# Patient Record
Sex: MALE | Race: WHITE | NOT HISPANIC OR LATINO
[De-identification: names, ages, dates, MRNs, and addresses within clinical notes are randomized per-mention and may not be internally consistent; named-entity substitution may affect disease eponyms.]

---

## 2016-12-29 NOTE — H&P ADULT. - HISTORY OF PRESENT ILLNESS
Onset 3-4 days ago of mild LLQ pain.  Pain became severe yesterday. No nausea, fever.  Normal BM 2 days ago.  No prior similar pain.  Colonoscopy 2014.

## 2016-12-29 NOTE — ED PROVIDER NOTE - PRINCIPAL DIAGNOSIS
Left lower quadrant abdominal tenderness Diverticulitis of intestine with abscess without bleeding, unspecified part of intestinal tract

## 2016-12-29 NOTE — ED PROVIDER NOTE - CARE PLAN
Principal Discharge DX:	Left lower quadrant abdominal tenderness Principal Discharge DX:	Diverticulitis of intestine with abscess without bleeding, unspecified part of intestinal tract

## 2016-12-29 NOTE — ED PROVIDER NOTE - PROGRESS NOTE DETAILS
patient declining analgesia medications discussed case with Dr. Estrada, will admit to his service, zosyn started

## 2016-12-29 NOTE — ED PROVIDER NOTE - OBJECTIVE STATEMENT
62 yo male hx of alcoholism (sober for 20 years) c/o left lower quadrant and suprapubic pain since yesterday.  No fever/chills, no nausea/vomiting/diarrhea.  Took motrin this morning that helped with the pain.  Sent in by Dr. Carly Chan for further evaluation.

## 2017-01-04 ENCOUNTER — OTHER (OUTPATIENT)
Age: 62
End: 2017-01-04

## 2017-01-04 DIAGNOSIS — K57.92 DIVERTICULITIS OF INTESTINE, PART UNSPECIFIED, W/OUT PERFORATION OR ABSCESS W/OUT BLEEDING: ICD-10-CM

## 2017-01-06 ENCOUNTER — OTHER (OUTPATIENT)
Age: 62
End: 2017-01-06

## 2017-01-06 ENCOUNTER — TRANSCRIPTION ENCOUNTER (OUTPATIENT)
Age: 62
End: 2017-01-06

## 2017-01-06 NOTE — DISCHARGE NOTE ADULT - CARE PROVIDERS DIRECT ADDRESSES
,[email or web address]

## 2017-01-06 NOTE — DISCHARGE NOTE ADULT - PROVIDER TOKENS
TOKSCARLET:'[id number] TOKEN:'[id number]:[LAST:[brand],FIRST:[kath],PHONE:[phone]],FAX:[(   )    -],ADDRESS:[follow up for Xarelto Management and repeat US doppler of RUE]] TOKEN:'[id number]:[LAST:[Brand],FIRST:[Carly],PHONE:[(   )    -],FAX:[(   )    -],ADDRESS:[For management of warfarin]]

## 2017-01-06 NOTE — DISCHARGE NOTE ADULT - MEDICATION SUMMARY - MEDICATIONS TO TAKE
I will START or STAY ON the medications listed below when I get home from the hospital:    amoxicillin-clavulanate 875 mg-125 mg oral tablet  -- 1 tab(s) by mouth 2 times a day  -- Finish all this medication unless otherwise directed by prescriber.  Take with food or milk.    -- Indication: For Diverticulitis of intestine with perforation or abscess without bleeding I will START or STAY ON the medications listed below when I get home from the hospital:    warfarin 2 mg oral tablet  -- 5.5 milligram(s) by mouth once a day  -- Indication: For pulmonary emboli and DVT I will START or STAY ON the medications listed below when I get home from the hospital:    warfarin 2 mg oral tablet  -- 5.5 milligram(s) by mouth once a day  -- Indication: For Pulmonary embolism

## 2017-01-06 NOTE — DISCHARGE NOTE ADULT - COMMUNITY RESOURCES
PT/INR 2x week x 2 weeks- 1/16-1/20-1/23/-1/27 at [facility] -   Results to be sent to Dr. Keshia Chan [phone]

## 2017-01-06 NOTE — DISCHARGE NOTE ADULT - NS AS DC VTE INSTRUCTION
Coumadin/Warfarin - Compliance.../Coumadin/Warfarin - Dietary Advice.../Coumadin/Warfarin - Potential for adverse drug reactions and interactions/Coumadin/Warfarin - Follow-up monitoring...

## 2017-01-06 NOTE — DISCHARGE NOTE ADULT - PLAN OF CARE
resolution of diverticulitis low fiber diet until see at f/u appointment in 10 days with Dr. Katz,  Make follow up appointment to see Dr. Katz in 10 days, [phone]  No heavy lifting  If fever, increasing pain, changes in bowel movements please call office Improve breathing and resolve blood clot Coumadin for blood clot- 5.5 mg daily. Check INR on Tuesday low fiber diet until see at f/u appointment in  2 weeks with Dr. Katz, No further antibiotics required at this time.   Make follow up appointment to see Dr. Katz in 2 weeks, [phone]  If fever, increasing pain, changes in bowel movements please call office

## 2017-01-06 NOTE — DISCHARGE NOTE ADULT - CARE PLAN
Principal Discharge DX:	Diverticulitis of intestine with abscess without bleeding, unspecified part of intestinal tract  Goal:	resolution of diverticulitis  Instructions for follow-up, activity and diet:	low fiber diet until see at f/u appointment in 10 days with Dr. Katz,  Make follow up appointment to see Dr. Katz in 10 days, [phone]  No heavy lifting  If fever, increasing pain, changes in bowel movements please call office Principal Discharge DX:	Diverticulitis of intestine with abscess without bleeding, unspecified part of intestinal tract  Goal:	resolution of diverticulitis  Instructions for follow-up, activity and diet:	low fiber diet until see at f/u appointment in 10 days with Dr. Katz,  Make follow up appointment to see Dr. Katz in 10 days, [phone]  No heavy lifting  If fever, increasing pain, changes in bowel movements please call office Principal Discharge DX:	Diverticulitis of intestine with abscess without bleeding, unspecified part of intestinal tract  Goal:	resolution of diverticulitis  Instructions for follow-up, activity and diet:	low fiber diet until see at f/u appointment in 10 days with Dr. Katz,  Make follow up appointment to see Dr. Katz in 10 days, [phone]  No heavy lifting  If fever, increasing pain, changes in bowel movements please call office Principal Discharge DX:	Diverticulitis of intestine with abscess without bleeding, unspecified part of intestinal tract  Goal:	resolution of diverticulitis  Instructions for follow-up, activity and diet:	low fiber diet until see at f/u appointment in 10 days with Dr. Katz,  Make follow up appointment to see Dr. Katz in 10 days, [phone]  No heavy lifting  If fever, increasing pain, changes in bowel movements please call office Principal Discharge DX:	Diverticulitis of intestine with abscess without bleeding, unspecified part of intestinal tract  Goal:	resolution of diverticulitis  Instructions for follow-up, activity and diet:	low fiber diet until see at f/u appointment in 10 days with Dr. Katz,  Make follow up appointment to see Dr. Katz in 10 days, [phone]  No heavy lifting  If fever, increasing pain, changes in bowel movements please call office Principal Discharge DX:	Diverticulitis of intestine with abscess without bleeding, unspecified part of intestinal tract  Goal:	resolution of diverticulitis  Instructions for follow-up, activity and diet:	low fiber diet until see at f/u appointment in 10 days with Dr. Katz,  Make follow up appointment to see Dr. Katz in 10 days, [phone]  No heavy lifting  If fever, increasing pain, changes in bowel movements please call office Principal Discharge DX:	Diverticulitis of intestine with abscess without bleeding, unspecified part of intestinal tract  Goal:	resolution of diverticulitis  Instructions for follow-up, activity and diet:	low fiber diet until see at f/u appointment in  2 weeks with Dr. Katz, No further antibiotics required at this time.   Make follow up appointment to see Dr. Katz in 2 weeks, [phone]  If fever, increasing pain, changes in bowel movements please call office  Secondary Diagnosis:	Pulmonary embolism  Goal:	Improve breathing and resolve blood clot  Instructions for follow-up, activity and diet:	Coumadin for blood clot- 5.5 mg daily. Check INR on Tuesday Principal Discharge DX:	Diverticulitis of intestine with abscess without bleeding, unspecified part of intestinal tract  Goal:	resolution of diverticulitis  Instructions for follow-up, activity and diet:	low fiber diet until see at f/u appointment in  2 weeks with Dr. Katz, No further antibiotics required at this time.   Make follow up appointment to see Dr. Katz in 2 weeks, [phone]  If fever, increasing pain, changes in bowel movements please call office  Secondary Diagnosis:	Pulmonary embolism  Goal:	Improve breathing and resolve blood clot  Instructions for follow-up, activity and diet:	Coumadin for blood clot- 5.5 mg daily. Check INR on Tuesday Principal Discharge DX:	Diverticulitis of intestine with abscess without bleeding, unspecified part of intestinal tract  Goal:	resolution of diverticulitis  Instructions for follow-up, activity and diet:	low fiber diet until see at f/u appointment in  2 weeks with Dr. Katz, No further antibiotics required at this time.   Make follow up appointment to see Dr. Katz in 2 weeks, [phone]  If fever, increasing pain, changes in bowel movements please call office  Secondary Diagnosis:	Pulmonary embolism  Goal:	Improve breathing and resolve blood clot  Instructions for follow-up, activity and diet:	Coumadin for blood clot- 5.5 mg daily. Check INR on Tuesday Principal Discharge DX:	Diverticulitis of intestine with abscess without bleeding, unspecified part of intestinal tract  Goal:	resolution of diverticulitis  Instructions for follow-up, activity and diet:	low fiber diet until see at f/u appointment in  2 weeks with Dr. Katz, No further antibiotics required at this time.   Make follow up appointment to see Dr. Katz in 2 weeks, [phone]  If fever, increasing pain, changes in bowel movements please call office  Secondary Diagnosis:	Pulmonary embolism  Goal:	Improve breathing and resolve blood clot  Instructions for follow-up, activity and diet:	Coumadin for blood clot- 5.5 mg daily. Check INR on Tuesday Principal Discharge DX:	Diverticulitis of intestine with abscess without bleeding, unspecified part of intestinal tract  Goal:	resolution of diverticulitis  Instructions for follow-up, activity and diet:	low fiber diet until see at f/u appointment in  2 weeks with Dr. Katz, No further antibiotics required at this time.   Make follow up appointment to see Dr. Katz in 2 weeks, [phone]  If fever, increasing pain, changes in bowel movements please call office  Secondary Diagnosis:	Pulmonary embolism  Goal:	Improve breathing and resolve blood clot  Instructions for follow-up, activity and diet:	Coumadin for blood clot- 5.5 mg daily. Check INR on Tuesday

## 2017-01-06 NOTE — DISCHARGE NOTE ADULT - CARE PROVIDER_API CALL
Joselo Katz), ColonRectal Surgery; Surgery  [address] [facility] [address] [place] [address]  Phone: [phone]  Fax: [phone] Joselo Katz), ColonRectal Surgery; Surgery  [address] [facility] [address] [place] [address]  Phone: [phone]  Fax: [phone]    brand, kath  follow up for Xarelto Management and repeat US doppler of RUE  Phone: [phone]  Fax: (   )    - Joselo Katz), ColonRectal Surgery; Surgery  [address] [facility] [address] [place] [address]  Phone: [phone]  Fax: [phone]    Carly Chan  For management of warfarin  Phone: (   )    -  Fax: (   )    -

## 2017-01-06 NOTE — DISCHARGE NOTE ADULT - PATIENT PORTAL LINK FT
“You can access the FollowHealth Patient Portal, offered by [facility], by registering with the following website: [email or web address]”

## 2017-01-06 NOTE — DISCHARGE NOTE ADULT - HOSPITAL COURSE
61 year old male presented with LLQ pain x2-3 days. On ct abd/pelvis showed to have sigmoid diverticulitis with abscess. pt placed on IV antibiotics with repeat ct scan revealing improved diverticulitis. pt will be discharged on augmentin 875/125 mg BID x 10days and will followup in 10 days with Dr. Katz. Repeat ct scan to be done prior to follow up to evaluate diverticulitis 61 year old male presented with left lower quadrant pain  for 2-3 days. He was found to have sigmoid diverticulitis with abscess on CT scan. He was treated with IV antibiotics, bowel rest and repeat ct scan revealed improved diverticulitis. He improved clinically and initially plan was for home IV antibiotics given intramural abscess. As such, a PICC line was placed. Decision was made to discharge him on oral antibiotics given his rapid improvement. On the day of PICC line removal, there was swelling in the right upper extremity and Ultrasound confirmed a DVT. He was initiated on lovenox and subsequently warfarin. He also developed a pulmonary emboli confirmed on Chest CT. He remained hemodynamically stable and is discharged with a therapeutic INR (goal 2-3) on coumadin.

## 2017-01-06 NOTE — DISCHARGE NOTE ADULT - ADDITIONAL INSTRUCTIONS
Make appointment to see Dr. Katz in office in 10 days [phone]  Follow low fiber diet until you see Dr. Katz  No Heavy Lifting Make appointment to see Dr. Katz in office in 10 days [phone]  Follow low fiber diet until you see Dr. Katz  No Heavy Lifting  Follow up with Keshia Chan - libby PMD, follow up for management of Xarelto for RUE DVT and repeat US doppler for RUE DVT in 1 month and in 3 months  discussed with the patient Make appointment to see Dr. Katz in office in 2 weeks [phone]  Follow low fiber diet until you see Dr. Katz    Follow up with Dr. Carly Chan - medical PMD, follow up for management of Coumadin for right upper arm blood clot and pulmonary embolism.

## 2017-01-12 ENCOUNTER — OTHER (OUTPATIENT)
Age: 62
End: 2017-01-12

## 2017-01-14 RX ORDER — WARFARIN SODIUM 2.5 MG/1
6 TABLET ORAL
Qty: 0 | Refills: 0 | COMMUNITY
Start: 2017-01-14 | End: 2017-02-13

## 2017-01-16 ENCOUNTER — OUTPATIENT (OUTPATIENT)
Dept: OUTPATIENT SERVICES | Facility: HOSPITAL | Age: 62
LOS: 1 days | End: 2017-01-16

## 2017-01-16 DIAGNOSIS — I82.91 CHRONIC EMBOLISM AND THROMBOSIS OF UNSPECIFIED VEIN: ICD-10-CM

## 2017-01-16 PROCEDURE — 85610 PROTHROMBIN TIME: CPT

## 2017-01-20 ENCOUNTER — OUTPATIENT (OUTPATIENT)
Dept: OUTPATIENT SERVICES | Facility: HOSPITAL | Age: 62
LOS: 1 days | End: 2017-01-20

## 2017-01-20 DIAGNOSIS — I82.409 ACUTE EMBOLISM AND THROMBOSIS OF UNSPECIFIED DEEP VEINS OF UNSPECIFIED LOWER EXTREMITY: ICD-10-CM

## 2017-01-20 PROCEDURE — 85610 PROTHROMBIN TIME: CPT

## 2017-01-23 ENCOUNTER — OUTPATIENT (OUTPATIENT)
Dept: OUTPATIENT SERVICES | Facility: HOSPITAL | Age: 62
LOS: 1 days | End: 2017-01-23

## 2017-01-23 DIAGNOSIS — D64.9 ANEMIA, UNSPECIFIED: ICD-10-CM

## 2017-01-23 DIAGNOSIS — I82.91 CHRONIC EMBOLISM AND THROMBOSIS OF UNSPECIFIED VEIN: ICD-10-CM

## 2017-01-23 PROCEDURE — 85610 PROTHROMBIN TIME: CPT

## 2017-01-27 ENCOUNTER — OUTPATIENT (OUTPATIENT)
Dept: OUTPATIENT SERVICES | Facility: HOSPITAL | Age: 62
LOS: 1 days | End: 2017-01-27

## 2017-01-27 DIAGNOSIS — I82.91 CHRONIC EMBOLISM AND THROMBOSIS OF UNSPECIFIED VEIN: ICD-10-CM

## 2017-01-27 PROCEDURE — 85610 PROTHROMBIN TIME: CPT

## 2017-03-15 ENCOUNTER — RX RENEWAL (OUTPATIENT)
Age: 62
End: 2017-03-15

## 2017-03-18 ENCOUNTER — RX RENEWAL (OUTPATIENT)
Age: 62
End: 2017-03-18

## 2017-09-05 ENCOUNTER — APPOINTMENT (OUTPATIENT)
Dept: ULTRASOUND IMAGING | Facility: HOSPITAL | Age: 62
End: 2017-09-05

## 2017-09-09 ENCOUNTER — OUTPATIENT (OUTPATIENT)
Dept: OUTPATIENT SERVICES | Facility: HOSPITAL | Age: 62
LOS: 1 days | End: 2017-09-09
Payer: COMMERCIAL

## 2017-09-09 ENCOUNTER — APPOINTMENT (OUTPATIENT)
Dept: ULTRASOUND IMAGING | Facility: CLINIC | Age: 62
End: 2017-09-09

## 2017-09-09 DIAGNOSIS — Z00.8 ENCOUNTER FOR OTHER GENERAL EXAMINATION: ICD-10-CM

## 2017-09-09 PROCEDURE — 93971 EXTREMITY STUDY: CPT | Mod: 26,RT

## 2017-09-09 PROCEDURE — 93971 EXTREMITY STUDY: CPT

## 2018-01-10 NOTE — PATIENT PROFILE ADULT. - VISION (WITH CORRECTIVE LENSES IF THE PATIENT USUALLY WEARS THEM):
Partially impaired: cannot see medication labels or newsprint, but can see obstacles in path, and the surrounding layout; can count fingers at arm's length Ethiopian

## 2018-06-20 ENCOUNTER — APPOINTMENT (OUTPATIENT)
Dept: SPINE | Facility: CLINIC | Age: 63
End: 2018-06-20
Payer: COMMERCIAL

## 2018-06-20 VITALS
HEART RATE: 102 BPM | SYSTOLIC BLOOD PRESSURE: 119 MMHG | DIASTOLIC BLOOD PRESSURE: 78 MMHG | BODY MASS INDEX: 29.96 KG/M2 | HEIGHT: 68.5 IN | WEIGHT: 200 LBS

## 2018-06-20 DIAGNOSIS — Z87.09 PERSONAL HISTORY OF OTHER DISEASES OF THE RESPIRATORY SYSTEM: ICD-10-CM

## 2018-06-20 DIAGNOSIS — Z86.69 PERSONAL HISTORY OF OTHER DISEASES OF THE NERVOUS SYSTEM AND SENSE ORGANS: ICD-10-CM

## 2018-06-20 DIAGNOSIS — M48.061 SPINAL STENOSIS, LUMBAR REGION WITHOUT NEUROGENIC CLAUDICATION: ICD-10-CM

## 2018-06-20 PROCEDURE — 99244 OFF/OP CNSLTJ NEW/EST MOD 40: CPT

## 2018-06-20 RX ORDER — ERTAPENEM SODIUM 1 G/1
1 INJECTION, POWDER, LYOPHILIZED, FOR SOLUTION INTRAVENOUS
Qty: 1 | Refills: 0 | Status: COMPLETED | OUTPATIENT
Start: 2017-01-04 | End: 2018-06-20

## 2018-06-20 RX ORDER — AMOXICILLIN AND CLAVULANATE POTASSIUM 875; 125 MG/1; MG/1
875-125 TABLET, COATED ORAL TWICE DAILY
Qty: 20 | Refills: 0 | Status: COMPLETED | COMMUNITY
Start: 2017-01-06 | End: 2018-06-20

## 2018-06-20 RX ORDER — WARFARIN 6 MG/1
6 TABLET ORAL
Refills: 0 | Status: ACTIVE | COMMUNITY

## 2018-09-20 ENCOUNTER — APPOINTMENT (OUTPATIENT)
Dept: NEUROLOGY | Facility: CLINIC | Age: 63
End: 2018-09-20
Payer: COMMERCIAL

## 2018-09-20 VITALS
HEIGHT: 68.5 IN | WEIGHT: 222 LBS | HEART RATE: 87 BPM | DIASTOLIC BLOOD PRESSURE: 83 MMHG | SYSTOLIC BLOOD PRESSURE: 127 MMHG | BODY MASS INDEX: 33.26 KG/M2

## 2018-09-20 PROCEDURE — 99205 OFFICE O/P NEW HI 60 MIN: CPT

## 2018-10-05 ENCOUNTER — RX RENEWAL (OUTPATIENT)
Age: 63
End: 2018-10-05

## 2018-10-10 ENCOUNTER — APPOINTMENT (OUTPATIENT)
Dept: SPINE | Facility: CLINIC | Age: 63
End: 2018-10-10
Payer: COMMERCIAL

## 2018-10-10 VITALS
DIASTOLIC BLOOD PRESSURE: 86 MMHG | HEIGHT: 68.5 IN | HEART RATE: 85 BPM | BODY MASS INDEX: 32.66 KG/M2 | SYSTOLIC BLOOD PRESSURE: 132 MMHG | WEIGHT: 218 LBS

## 2018-10-10 PROCEDURE — 99214 OFFICE O/P EST MOD 30 MIN: CPT

## 2018-10-10 PROCEDURE — 99213 OFFICE O/P EST LOW 20 MIN: CPT

## 2018-11-30 ENCOUNTER — OUTPATIENT (OUTPATIENT)
Dept: OUTPATIENT SERVICES | Facility: HOSPITAL | Age: 63
LOS: 1 days | End: 2018-11-30
Payer: COMMERCIAL

## 2018-11-30 VITALS
RESPIRATION RATE: 18 BRPM | WEIGHT: 220.9 LBS | TEMPERATURE: 97 F | SYSTOLIC BLOOD PRESSURE: 120 MMHG | OXYGEN SATURATION: 96 % | HEIGHT: 68 IN | DIASTOLIC BLOOD PRESSURE: 82 MMHG | HEART RATE: 96 BPM

## 2018-11-30 DIAGNOSIS — I26.99 OTHER PULMONARY EMBOLISM WITHOUT ACUTE COR PULMONALE: ICD-10-CM

## 2018-11-30 DIAGNOSIS — Z01.818 ENCOUNTER FOR OTHER PREPROCEDURAL EXAMINATION: ICD-10-CM

## 2018-11-30 DIAGNOSIS — G95.9 DISEASE OF SPINAL CORD, UNSPECIFIED: ICD-10-CM

## 2018-11-30 DIAGNOSIS — Z90.49 ACQUIRED ABSENCE OF OTHER SPECIFIED PARTS OF DIGESTIVE TRACT: Chronic | ICD-10-CM

## 2018-11-30 DIAGNOSIS — G47.33 OBSTRUCTIVE SLEEP APNEA (ADULT) (PEDIATRIC): ICD-10-CM

## 2018-11-30 LAB
ANION GAP SERPL CALC-SCNC: 11 MMOL/L — SIGNIFICANT CHANGE UP (ref 5–17)
BLD GP AB SCN SERPL QL: NEGATIVE — SIGNIFICANT CHANGE UP
BUN SERPL-MCNC: 17 MG/DL — SIGNIFICANT CHANGE UP (ref 7–23)
CALCIUM SERPL-MCNC: 9.8 MG/DL — SIGNIFICANT CHANGE UP (ref 8.4–10.5)
CHLORIDE SERPL-SCNC: 104 MMOL/L — SIGNIFICANT CHANGE UP (ref 96–108)
CO2 SERPL-SCNC: 26 MMOL/L — SIGNIFICANT CHANGE UP (ref 22–31)
CREAT SERPL-MCNC: 0.99 MG/DL — SIGNIFICANT CHANGE UP (ref 0.5–1.3)
GLUCOSE SERPL-MCNC: 108 MG/DL — HIGH (ref 70–99)
HCT VFR BLD CALC: 44.7 % — SIGNIFICANT CHANGE UP (ref 39–50)
HGB BLD-MCNC: 14.6 G/DL — SIGNIFICANT CHANGE UP (ref 13–17)
MCHC RBC-ENTMCNC: 28.7 PG — SIGNIFICANT CHANGE UP (ref 27–34)
MCHC RBC-ENTMCNC: 32.7 GM/DL — SIGNIFICANT CHANGE UP (ref 32–36)
MCV RBC AUTO: 88 FL — SIGNIFICANT CHANGE UP (ref 80–100)
MRSA PCR RESULT.: SIGNIFICANT CHANGE UP
PLATELET # BLD AUTO: 270 K/UL — SIGNIFICANT CHANGE UP (ref 150–400)
POTASSIUM SERPL-MCNC: 4 MMOL/L — SIGNIFICANT CHANGE UP (ref 3.5–5.3)
POTASSIUM SERPL-SCNC: 4 MMOL/L — SIGNIFICANT CHANGE UP (ref 3.5–5.3)
RBC # BLD: 5.08 M/UL — SIGNIFICANT CHANGE UP (ref 4.2–5.8)
RBC # FLD: 14 % — SIGNIFICANT CHANGE UP (ref 10.3–14.5)
RH IG SCN BLD-IMP: POSITIVE — SIGNIFICANT CHANGE UP
S AUREUS DNA NOSE QL NAA+PROBE: DETECTED
SODIUM SERPL-SCNC: 141 MMOL/L — SIGNIFICANT CHANGE UP (ref 135–145)
WBC # BLD: 5.68 K/UL — SIGNIFICANT CHANGE UP (ref 3.8–10.5)
WBC # FLD AUTO: 5.68 K/UL — SIGNIFICANT CHANGE UP (ref 3.8–10.5)

## 2018-11-30 PROCEDURE — 80048 BASIC METABOLIC PNL TOTAL CA: CPT

## 2018-11-30 PROCEDURE — 87640 STAPH A DNA AMP PROBE: CPT

## 2018-11-30 PROCEDURE — G0463: CPT

## 2018-11-30 PROCEDURE — 86850 RBC ANTIBODY SCREEN: CPT

## 2018-11-30 PROCEDURE — 87641 MR-STAPH DNA AMP PROBE: CPT

## 2018-11-30 PROCEDURE — 86900 BLOOD TYPING SEROLOGIC ABO: CPT

## 2018-11-30 PROCEDURE — 85027 COMPLETE CBC AUTOMATED: CPT

## 2018-11-30 PROCEDURE — 86901 BLOOD TYPING SEROLOGIC RH(D): CPT

## 2018-11-30 NOTE — H&P PST ADULT - HISTORY OF PRESENT ILLNESS
63yr old male presents to PST for a C3-4 Anterior Cervical Discectomy and Fusion on 12/17/2018. 63yr old pleasant male presents to PST for a C3-4 Anterior Cervical Discectomy and Fusion on 12/17/2018. PMH: KATHIE (CPAP), Diverticulitis (2017- admission c/b PE now on Coumadin, followed by Hematology. As per Hematology pt to see him prior to sx to discuss possible bridging to Lovenox) and Possible Factor V Leiden as per Hematologist.  Pt denies any chest pain or SOB and feels well otherwise.

## 2018-11-30 NOTE — H&P PST ADULT - PROBLEM SELECTOR PLAN 2
Pt currently on Coumadin- will see hematologist for possible bridge to Lovenox prior to procedure. RedCap# [id number]

## 2018-12-02 PROBLEM — K57.92 DIVERTICULITIS OF INTESTINE, PART UNSPECIFIED, WITHOUT PERFORATION OR ABSCESS WITHOUT BLEEDING: Chronic | Status: ACTIVE | Noted: 2018-11-30

## 2018-12-03 ENCOUNTER — CLINICAL ADVICE (OUTPATIENT)
Age: 63
End: 2018-12-03

## 2018-12-03 DIAGNOSIS — Z22.321 CARRIER OR SUSPECTED CARRIER OF METHICILLIN SUSCEPTIBLE STAPHYLOCOCCUS AUREUS: ICD-10-CM

## 2018-12-07 PROBLEM — H18.51 ENDOTHELIAL CORNEAL DYSTROPHY: Chronic | Status: ACTIVE | Noted: 2018-11-30

## 2018-12-07 PROBLEM — G47.33 OBSTRUCTIVE SLEEP APNEA (ADULT) (PEDIATRIC): Chronic | Status: ACTIVE | Noted: 2018-11-30

## 2018-12-07 PROBLEM — D68.51 ACTIVATED PROTEIN C RESISTANCE: Chronic | Status: ACTIVE | Noted: 2018-11-30

## 2018-12-07 PROBLEM — G56.03 CARPAL TUNNEL SYNDROME, BILATERAL UPPER LIMBS: Chronic | Status: ACTIVE | Noted: 2018-11-30

## 2018-12-07 PROBLEM — I26.99 OTHER PULMONARY EMBOLISM WITHOUT ACUTE COR PULMONALE: Chronic | Status: ACTIVE | Noted: 2018-11-30

## 2018-12-07 PROBLEM — G95.9 DISEASE OF SPINAL CORD, UNSPECIFIED: Chronic | Status: ACTIVE | Noted: 2018-11-30

## 2018-12-12 ENCOUNTER — NON-APPOINTMENT (OUTPATIENT)
Age: 63
End: 2018-12-12

## 2018-12-12 ENCOUNTER — APPOINTMENT (OUTPATIENT)
Dept: CARDIOLOGY | Facility: CLINIC | Age: 63
End: 2018-12-12
Payer: COMMERCIAL

## 2018-12-12 VITALS — DIASTOLIC BLOOD PRESSURE: 80 MMHG | SYSTOLIC BLOOD PRESSURE: 128 MMHG

## 2018-12-12 VITALS
SYSTOLIC BLOOD PRESSURE: 130 MMHG | HEIGHT: 68.5 IN | OXYGEN SATURATION: 97 % | WEIGHT: 222 LBS | HEART RATE: 91 BPM | BODY MASS INDEX: 33.26 KG/M2 | DIASTOLIC BLOOD PRESSURE: 88 MMHG

## 2018-12-12 PROCEDURE — 99204 OFFICE O/P NEW MOD 45 MIN: CPT

## 2018-12-12 PROCEDURE — 93000 ELECTROCARDIOGRAM COMPLETE: CPT

## 2018-12-12 RX ORDER — LACTOBACILLUS ACIDOPHILUS/PECT 30 MG-20MG
TABLET ORAL
Refills: 0 | Status: DISCONTINUED | COMMUNITY
End: 2018-12-12

## 2018-12-12 NOTE — HISTORY OF PRESENT ILLNESS
[FreeTextEntry1] : Mr. Brock Abbott presented to the office today for initial cardiac evaluation, and specifically, he was referred here by Dr. Chan for preoperative cardiac evaluation. He was diagnosed as having cervical disc disease associated with spinal stenosis and cervical myelopathy several months ago. He is scheduled to undergo C3-C4 anterior discectomy with interbody fusion and anterior instrumentation on 12/17/18, to be performed by Dr. Brock Bone at [facility] as an ambulatory procedure.\par \par The patient is a 63-year-old male who does not have a known cardiac history. He does not describe having experienced chest discomfort or dyspnea on exertion in association with his activities. He has not noted orthopnea or paroxysmal nocturnal dyspnea, noting that he sleeps with a CPAP device for treatment of obstructive sleep apnea. He has not noted lower extremity edema. He has not experienced any episodes of palpitations, presyncope or syncope.\par \par As far as risk factors for coronary artery disease are concerned, the patient has a history of a dyslipidemia, however, he has declined therapy to date. He does not have a history of diabetes. He does not have a history of hypertension. He discontinued cigarette smoking approximately 25 years ago, having previously smoked 1-1/2 packs per day for a period of 20 years. He does not have a known immediate family history of premature coronary artery disease.\par \par Past medical/surgical history is otherwise significant for the patient having been hospitalized at [facility] from 12/29/16 through 1/14/17 with sigmoid diverticulitis, for which a PICC line had been inserted for delivery of antibiotic therapy. The patient developed right upper extremity swelling, for which the PICC line was removed, and sonography revealed evidence for a right upper extremity deep venous thrombosis, for which anticoagulation was initiated (Lovenox, subsequently transitioning to Coumadin). A chest CT angiogram performed at that time revealed evidence for pulmonary emboli as well. The patient subsequently underwent an evaluation with a hematologist, and was diagnosed as having a circulating lupus anticoagulant, for which anticoagulation was recommended indefinitely, in view of this finding and the clotting event. He underwent a screening colonoscopy on 3/7/14, at which time he was noted to have diverticulosis, as well as 2 polyps, which were resected (right colon tubular adenoma and sigmoid hyperplastic polyp). He underwent a sleep study on 1/6/14 which was positive for evidence of obstructive sleep apnea, for which CPAP therapy was prescribed period he is status post laparoscopic appendectomy several years ago. He has a history of GERD. He has a history of Fuchs corneal dystrophy. He is a recovering alcoholic, having last consumed alcohol more than 25 years ago.\par \par Echocardiography performed during the patient's hospitalization at [facility] on 1/10/17 revealed normal cardiac chamber sizes with normal left ventricular wall thickness and wall motion. Left ventricular systolic function was normal, with an estimated ejection fraction of 70%. Mild left ventricular diastolic dysfunction was demonstrated. No significant valvular abnormalities were demonstrated. There was no evidence of pulmonary hypertension.\par \par Laboratory studies performed on 11/11/13 revealed cholesterol 262, triglycerides 157, HDL 52, and calculated .

## 2018-12-12 NOTE — DISCUSSION/SUMMARY
[FreeTextEntry1] : Mr. Abbott does not have a known cardiac history. He had an echocardiogram performed after being diagnosed with right upper extremity DVT/pulmonary emboli on 1/10/17 which was interpreted as being unremarkable only for mild left ventricular diastolic dysfunction. He has a history of a dyslipidemia, for which he has declined treatment to date. He does not describe having experienced any signs or symptoms to suggest the presence of an anginal syndrome, congestive heart failure, or a hemodynamically-compromising arrhythmia. His cardiac examination today is unremarkable. His blood pressure reading today is normal. His electrocardiogram today reveals sinus rhythm with mild non-specific T-wave abnormalities.\par \par I find no cardiac contraindication to the patient undergoing the proposed neurosurgical procedure as scheduled, under routine perioperative monitoring conditions. In anticipation of this neurosurgical procedure, the patient took his last dosage of Coumadin yesterday, and is presently being "bridged" with Lovenox, with the last injection to be administered on the morning of 12/16/18. Anticoagulation should be resumed postoperatively, as soon as deemed to be reasonably safe from the neurosurgeon's perspective.\par \par The importance of proper dietary habits, weight loss, and regular exercise as tolerated was discussed with the patient today.\par \par I have asked the patient to call me if he should have any questions or problems pertaining to these matters, and especially if he should expect any concerning symptoms. I have otherwise asked him to return to the office within the next few months, after recovering from his upcoming neurosurgical procedure, to address further evaluation and treatment of his dyslipidemia, including exercise stress testing and carotid artery Doppler testing.

## 2018-12-12 NOTE — PHYSICAL EXAM
[General Appearance - In No Acute Distress] : no acute distress [Normal Conjunctiva] : the conjunctiva exhibited no abnormalities [No Oral Pallor] : no oral pallor [Not Palpable] : not palpable [No Precordial Heave] : no precordial heave was noted [Normal Rate] : normal [Rhythm Regular] : regular [Normal S1] : normal S1 [Normal S2] : normal S2 [No Gallop] : no gallop heard [No Murmur] : no murmurs heard [2+] : left 2+ [No Abnormalities] : the abdominal aorta was not enlarged and no bruit was heard [No Pitting Edema] : no pitting edema present [Respiration, Rhythm And Depth] : normal respiratory rhythm and effort [Auscultation Breath Sounds / Voice Sounds] : lungs were clear to auscultation bilaterally [Bowel Sounds] : normal bowel sounds [Abdomen Tenderness] : non-tender [Abnormal Walk] : normal gait [Cyanosis, Localized] : no localized cyanosis [] : no rash [Oriented To Time, Place, And Person] : oriented to person, place, and time [FreeTextEntry1] : no JVD is appreciated at a 45° angle [Apical Thrill] : no thrill palpable at the apex [Click] : no click [Distant] : the heart sounds were ~L not distant [Pericardial Rub] : no pericardial rub [Right Carotid Bruit] : no bruit heard over the right carotid [Left Carotid Bruit] : no bruit heard over the left carotid

## 2018-12-12 NOTE — REASON FOR VISIT
[Initial Evaluation] : an initial evaluation of [Hyperlipidemia] : hyperlipidemia [FreeTextEntry1] : a preoperative cardiac evaluation

## 2018-12-16 ENCOUNTER — TRANSCRIPTION ENCOUNTER (OUTPATIENT)
Age: 63
End: 2018-12-16

## 2018-12-17 ENCOUNTER — INPATIENT (INPATIENT)
Facility: HOSPITAL | Age: 63
LOS: 0 days | Discharge: HOME | DRG: 516 | End: 2018-12-18
Attending: NEUROLOGICAL SURGERY | Admitting: NEUROLOGICAL SURGERY
Payer: COMMERCIAL

## 2018-12-17 ENCOUNTER — APPOINTMENT (OUTPATIENT)
Dept: SPINE | Facility: HOSPITAL | Age: 63
End: 2018-12-17

## 2018-12-17 VITALS
DIASTOLIC BLOOD PRESSURE: 86 MMHG | SYSTOLIC BLOOD PRESSURE: 125 MMHG | OXYGEN SATURATION: 95 % | HEIGHT: 68 IN | HEART RATE: 92 BPM | WEIGHT: 220.9 LBS | RESPIRATION RATE: 17 BRPM | TEMPERATURE: 98 F

## 2018-12-17 DIAGNOSIS — G95.9 DISEASE OF SPINAL CORD, UNSPECIFIED: ICD-10-CM

## 2018-12-17 DIAGNOSIS — Z90.49 ACQUIRED ABSENCE OF OTHER SPECIFIED PARTS OF DIGESTIVE TRACT: Chronic | ICD-10-CM

## 2018-12-17 LAB
ANION GAP SERPL CALC-SCNC: 11 MMOL/L — SIGNIFICANT CHANGE UP (ref 5–17)
ANION GAP SERPL CALC-SCNC: 28 MMOL/L — HIGH (ref 5–17)
APTT BLD: 31.4 SEC — SIGNIFICANT CHANGE UP (ref 27.5–36.3)
BASOPHILS # BLD AUTO: 0 K/UL — SIGNIFICANT CHANGE UP (ref 0–0.2)
BASOPHILS NFR BLD AUTO: 0.7 % — SIGNIFICANT CHANGE UP (ref 0–2)
BUN SERPL-MCNC: 18 MG/DL — SIGNIFICANT CHANGE UP (ref 7–23)
BUN SERPL-MCNC: 9 MG/DL — SIGNIFICANT CHANGE UP (ref 7–23)
CALCIUM SERPL-MCNC: 3.5 MG/DL — CRITICAL LOW (ref 8.4–10.5)
CALCIUM SERPL-MCNC: 8.5 MG/DL — SIGNIFICANT CHANGE UP (ref 8.4–10.5)
CHLORIDE SERPL-SCNC: 106 MMOL/L — SIGNIFICANT CHANGE UP (ref 96–108)
CHLORIDE SERPL-SCNC: 127 MMOL/L — HIGH (ref 96–108)
CO2 SERPL-SCNC: 12 MMOL/L — LOW (ref 22–31)
CO2 SERPL-SCNC: 23 MMOL/L — SIGNIFICANT CHANGE UP (ref 22–31)
CREAT SERPL-MCNC: 0.34 MG/DL — LOW (ref 0.5–1.3)
CREAT SERPL-MCNC: 0.79 MG/DL — SIGNIFICANT CHANGE UP (ref 0.5–1.3)
EOSINOPHIL # BLD AUTO: 0.1 K/UL — SIGNIFICANT CHANGE UP (ref 0–0.5)
EOSINOPHIL NFR BLD AUTO: 1.7 % — SIGNIFICANT CHANGE UP (ref 0–6)
GLUCOSE SERPL-MCNC: 109 MG/DL — HIGH (ref 70–99)
GLUCOSE SERPL-MCNC: 53 MG/DL — LOW (ref 70–99)
HCT VFR BLD CALC: 41.5 % — SIGNIFICANT CHANGE UP (ref 39–50)
HCT VFR BLD CALC: 43.2 % — SIGNIFICANT CHANGE UP (ref 39–50)
HGB BLD-MCNC: 14 G/DL — SIGNIFICANT CHANGE UP (ref 13–17)
HGB BLD-MCNC: 14.3 G/DL — SIGNIFICANT CHANGE UP (ref 13–17)
INR BLD: 0.92 RATIO — SIGNIFICANT CHANGE UP (ref 0.88–1.16)
LYMPHOCYTES # BLD AUTO: 0.9 K/UL — LOW (ref 1–3.3)
LYMPHOCYTES # BLD AUTO: 22 % — SIGNIFICANT CHANGE UP (ref 13–44)
MCHC RBC-ENTMCNC: 29.3 PG — SIGNIFICANT CHANGE UP (ref 27–34)
MCHC RBC-ENTMCNC: 29.6 PG — SIGNIFICANT CHANGE UP (ref 27–34)
MCHC RBC-ENTMCNC: 33.2 GM/DL — SIGNIFICANT CHANGE UP (ref 32–36)
MCHC RBC-ENTMCNC: 33.6 GM/DL — SIGNIFICANT CHANGE UP (ref 32–36)
MCV RBC AUTO: 87.9 FL — SIGNIFICANT CHANGE UP (ref 80–100)
MCV RBC AUTO: 88.3 FL — SIGNIFICANT CHANGE UP (ref 80–100)
MONOCYTES # BLD AUTO: 0.3 K/UL — SIGNIFICANT CHANGE UP (ref 0–0.9)
MONOCYTES NFR BLD AUTO: 7.2 % — SIGNIFICANT CHANGE UP (ref 2–14)
NEUTROPHILS # BLD AUTO: 2.9 K/UL — SIGNIFICANT CHANGE UP (ref 1.8–7.4)
NEUTROPHILS NFR BLD AUTO: 68.3 % — SIGNIFICANT CHANGE UP (ref 43–77)
PLATELET # BLD AUTO: 192 K/UL — SIGNIFICANT CHANGE UP (ref 150–400)
PLATELET # BLD AUTO: 209 K/UL — SIGNIFICANT CHANGE UP (ref 150–400)
POTASSIUM SERPL-MCNC: 3.8 MMOL/L — SIGNIFICANT CHANGE UP (ref 3.5–5.3)
POTASSIUM SERPL-MCNC: <2 MMOL/L — CRITICAL LOW (ref 3.5–5.3)
POTASSIUM SERPL-SCNC: 3.8 MMOL/L — SIGNIFICANT CHANGE UP (ref 3.5–5.3)
POTASSIUM SERPL-SCNC: <2 MMOL/L — CRITICAL LOW (ref 3.5–5.3)
PROTHROM AB SERPL-ACNC: 10.5 SEC — SIGNIFICANT CHANGE UP (ref 10–12.9)
RBC # BLD: 4.72 M/UL — SIGNIFICANT CHANGE UP (ref 4.2–5.8)
RBC # BLD: 4.89 M/UL — SIGNIFICANT CHANGE UP (ref 4.2–5.8)
RBC # FLD: 12.7 % — SIGNIFICANT CHANGE UP (ref 10.3–14.5)
RBC # FLD: 13.3 % — SIGNIFICANT CHANGE UP (ref 10.3–14.5)
RH IG SCN BLD-IMP: POSITIVE — SIGNIFICANT CHANGE UP
SODIUM SERPL-SCNC: 140 MMOL/L — SIGNIFICANT CHANGE UP (ref 135–145)
SODIUM SERPL-SCNC: 167 MMOL/L — CRITICAL HIGH (ref 135–145)
WBC # BLD: 4.3 K/UL — SIGNIFICANT CHANGE UP (ref 3.8–10.5)
WBC # BLD: 8.1 K/UL — SIGNIFICANT CHANGE UP (ref 3.8–10.5)
WBC # FLD AUTO: 4.3 K/UL — SIGNIFICANT CHANGE UP (ref 3.8–10.5)
WBC # FLD AUTO: 8.1 K/UL — SIGNIFICANT CHANGE UP (ref 3.8–10.5)

## 2018-12-17 RX ORDER — HYDROMORPHONE HYDROCHLORIDE 2 MG/ML
0.5 INJECTION INTRAMUSCULAR; INTRAVENOUS; SUBCUTANEOUS
Qty: 0 | Refills: 0 | Status: DISCONTINUED | OUTPATIENT
Start: 2018-12-17 | End: 2018-12-17

## 2018-12-17 RX ORDER — ACETAMINOPHEN 500 MG
650 TABLET ORAL EVERY 6 HOURS
Qty: 0 | Refills: 0 | Status: DISCONTINUED | OUTPATIENT
Start: 2018-12-17 | End: 2018-12-18

## 2018-12-17 RX ORDER — DEXTROSE MONOHYDRATE, SODIUM CHLORIDE, AND POTASSIUM CHLORIDE 50; .745; 4.5 G/1000ML; G/1000ML; G/1000ML
1000 INJECTION, SOLUTION INTRAVENOUS
Qty: 0 | Refills: 0 | Status: DISCONTINUED | OUTPATIENT
Start: 2018-12-17 | End: 2018-12-18

## 2018-12-17 RX ORDER — SODIUM CHLORIDE 9 MG/ML
3 INJECTION INTRAMUSCULAR; INTRAVENOUS; SUBCUTANEOUS EVERY 8 HOURS
Qty: 0 | Refills: 0 | Status: DISCONTINUED | OUTPATIENT
Start: 2018-12-17 | End: 2018-12-17

## 2018-12-17 RX ORDER — ONDANSETRON 8 MG/1
4 TABLET, FILM COATED ORAL EVERY 4 HOURS
Qty: 0 | Refills: 0 | Status: DISCONTINUED | OUTPATIENT
Start: 2018-12-17 | End: 2018-12-17

## 2018-12-17 RX ORDER — OXYCODONE HYDROCHLORIDE 5 MG/1
5 TABLET ORAL EVERY 4 HOURS
Qty: 0 | Refills: 0 | Status: DISCONTINUED | OUTPATIENT
Start: 2018-12-17 | End: 2018-12-18

## 2018-12-17 RX ORDER — CEFAZOLIN SODIUM 1 G
2000 VIAL (EA) INJECTION EVERY 8 HOURS
Qty: 0 | Refills: 0 | Status: DISCONTINUED | OUTPATIENT
Start: 2018-12-17 | End: 2018-12-18

## 2018-12-17 RX ORDER — ENOXAPARIN SODIUM 100 MG/ML
40 INJECTION SUBCUTANEOUS DAILY
Qty: 0 | Refills: 0 | Status: DISCONTINUED | OUTPATIENT
Start: 2018-12-18 | End: 2018-12-18

## 2018-12-17 RX ORDER — OXYCODONE HYDROCHLORIDE 5 MG/1
10 TABLET ORAL EVERY 4 HOURS
Qty: 0 | Refills: 0 | Status: DISCONTINUED | OUTPATIENT
Start: 2018-12-17 | End: 2018-12-18

## 2018-12-17 RX ORDER — LIDOCAINE HCL 20 MG/ML
0.2 VIAL (ML) INJECTION ONCE
Qty: 0 | Refills: 0 | Status: COMPLETED | OUTPATIENT
Start: 2018-12-17 | End: 2018-12-17

## 2018-12-17 RX ORDER — CEFAZOLIN SODIUM 1 G
2000 VIAL (EA) INJECTION ONCE
Qty: 0 | Refills: 0 | Status: COMPLETED | OUTPATIENT
Start: 2018-12-17 | End: 2018-12-17

## 2018-12-17 RX ADMIN — Medication 0.2 MILLILITER(S): at 08:30

## 2018-12-17 RX ADMIN — DEXTROSE MONOHYDRATE, SODIUM CHLORIDE, AND POTASSIUM CHLORIDE 75 MILLILITER(S): 50; .745; 4.5 INJECTION, SOLUTION INTRAVENOUS at 15:19

## 2018-12-17 RX ADMIN — HYDROMORPHONE HYDROCHLORIDE 0.5 MILLIGRAM(S): 2 INJECTION INTRAMUSCULAR; INTRAVENOUS; SUBCUTANEOUS at 12:50

## 2018-12-17 RX ADMIN — HYDROMORPHONE HYDROCHLORIDE 0.5 MILLIGRAM(S): 2 INJECTION INTRAMUSCULAR; INTRAVENOUS; SUBCUTANEOUS at 12:05

## 2018-12-17 RX ADMIN — OXYCODONE HYDROCHLORIDE 10 MILLIGRAM(S): 5 TABLET ORAL at 20:29

## 2018-12-17 RX ADMIN — SODIUM CHLORIDE 3 MILLILITER(S): 9 INJECTION INTRAMUSCULAR; INTRAVENOUS; SUBCUTANEOUS at 08:30

## 2018-12-17 RX ADMIN — HYDROMORPHONE HYDROCHLORIDE 0.5 MILLIGRAM(S): 2 INJECTION INTRAMUSCULAR; INTRAVENOUS; SUBCUTANEOUS at 12:20

## 2018-12-17 RX ADMIN — HYDROMORPHONE HYDROCHLORIDE 0.5 MILLIGRAM(S): 2 INJECTION INTRAMUSCULAR; INTRAVENOUS; SUBCUTANEOUS at 13:05

## 2018-12-17 RX ADMIN — OXYCODONE HYDROCHLORIDE 10 MILLIGRAM(S): 5 TABLET ORAL at 20:59

## 2018-12-17 NOTE — PROGRESS NOTE ADULT - SUBJECTIVE AND OBJECTIVE BOX
Patient seen and examined at bedside.    T(C): 36 (12-17-18 @ 11:46), Max: 36.6 (12-17-18 @ 08:29)  HR: 78 (12-17-18 @ 16:00) (68 - 92)  BP: 144/87 (12-17-18 @ 16:00) (125/86 - 152/89)  RR: 14 (12-17-18 @ 16:00) (14 - 20)  SpO2: 98% (12-17-18 @ 16:00) (94% - 99%)  Wt(kg): --    Exam:    Constitutional: No Acute Distress     Neurological: AOx3, Following Commands    Motor exam:          Upper extremity                         Delt     Bicep     Tricep    HG                                                 R         5/5        5/5        5/5       5/5                                               L          5/5        5/5        5/5       5/5          Lower extremity                        HF         KF        KE       DF         PF                                                  R        5/5        5/5        5/5       5/5         5/5                                               L         5/5        5/5       5/5       5/5          5/5                                                 Sensation: [X] intact to light touch  [] decreased:     No clonus    Incision: c/d/i

## 2018-12-17 NOTE — BRIEF OPERATIVE NOTE - PROCEDURE
<<-----Click on this checkbox to enter Procedure Spine surgery  12/17/2018  C3/4 ACDF  Active  [id number]

## 2018-12-17 NOTE — PHYSICAL THERAPY INITIAL EVALUATION ADULT - ADDITIONAL COMMENTS
Pt lives in a  with his spouse +1 flight of steps to negotiate with HR. Pt states he was ambulating independently without use of an AD prior and was independent with all ADls

## 2018-12-17 NOTE — PHYSICAL THERAPY INITIAL EVALUATION ADULT - PERTINENT HX OF CURRENT PROBLEM, REHAB EVAL
62 yo male with PMH KATHIE (CPAP), Diverticulitis (2017- admission c/b PE now on Coumadin, followed by Hematology. As per Hematology pt to see him prior to sx to discuss possible bridging to Lovenox) and Possible Factor V Leiden as per Hematologist.  Pt now s/p C3-4 Anterior Cervical Discectomy and Fusion on 12/17/2018.

## 2018-12-17 NOTE — PROGRESS NOTE ADULT - ASSESSMENT
63 yr old M s/p C3-C4 ACDF    Plan:   - Pain control  - Physical therapy  - Start low dose lovenox tomorrow AM  - [facility]

## 2018-12-17 NOTE — PHYSICAL THERAPY INITIAL EVALUATION ADULT - GENERAL OBSERVATIONS, REHAB EVAL
Pt rec'd supine in bed in PACU in NAD, VSS, +IVF, +venodynes, +dressing to anterior neck C/D/I, agreeable to PT

## 2018-12-17 NOTE — PHYSICAL THERAPY INITIAL EVALUATION ADULT - PLANNED THERAPY INTERVENTIONS, PT EVAL
transfer training/strengthening/balance training/bed mobility training/gait training/stair negotiation GOAL: pt will ascend/descend 1 flight of steps with U HR and step to pattern in 2 weeks

## 2018-12-18 ENCOUNTER — TRANSCRIPTION ENCOUNTER (OUTPATIENT)
Age: 63
End: 2018-12-18

## 2018-12-18 VITALS
TEMPERATURE: 98 F | OXYGEN SATURATION: 95 % | SYSTOLIC BLOOD PRESSURE: 135 MMHG | DIASTOLIC BLOOD PRESSURE: 80 MMHG | RESPIRATION RATE: 18 BRPM | HEART RATE: 103 BPM

## 2018-12-18 LAB
ANION GAP SERPL CALC-SCNC: 12 MMOL/L — SIGNIFICANT CHANGE UP (ref 5–17)
BASOPHILS # BLD AUTO: 0.01 K/UL — SIGNIFICANT CHANGE UP (ref 0–0.2)
BASOPHILS NFR BLD AUTO: 0.1 % — SIGNIFICANT CHANGE UP (ref 0–2)
BUN SERPL-MCNC: 17 MG/DL — SIGNIFICANT CHANGE UP (ref 7–23)
CALCIUM SERPL-MCNC: 8.4 MG/DL — SIGNIFICANT CHANGE UP (ref 8.4–10.5)
CHLORIDE SERPL-SCNC: 103 MMOL/L — SIGNIFICANT CHANGE UP (ref 96–108)
CO2 SERPL-SCNC: 22 MMOL/L — SIGNIFICANT CHANGE UP (ref 22–31)
CREAT SERPL-MCNC: 0.87 MG/DL — SIGNIFICANT CHANGE UP (ref 0.5–1.3)
EOSINOPHIL # BLD AUTO: 0 K/UL — SIGNIFICANT CHANGE UP (ref 0–0.5)
EOSINOPHIL NFR BLD AUTO: 0 % — SIGNIFICANT CHANGE UP (ref 0–6)
GLUCOSE SERPL-MCNC: 107 MG/DL — HIGH (ref 70–99)
HCT VFR BLD CALC: 38.7 % — LOW (ref 39–50)
HGB BLD-MCNC: 12.4 G/DL — LOW (ref 13–17)
IMM GRANULOCYTES NFR BLD AUTO: 0.1 % — SIGNIFICANT CHANGE UP (ref 0–1.5)
LYMPHOCYTES # BLD AUTO: 1.33 K/UL — SIGNIFICANT CHANGE UP (ref 1–3.3)
LYMPHOCYTES # BLD AUTO: 13.4 % — SIGNIFICANT CHANGE UP (ref 13–44)
MCHC RBC-ENTMCNC: 28.1 PG — SIGNIFICANT CHANGE UP (ref 27–34)
MCHC RBC-ENTMCNC: 32 GM/DL — SIGNIFICANT CHANGE UP (ref 32–36)
MCV RBC AUTO: 87.8 FL — SIGNIFICANT CHANGE UP (ref 80–100)
MONOCYTES # BLD AUTO: 1.34 K/UL — HIGH (ref 0–0.9)
MONOCYTES NFR BLD AUTO: 13.5 % — SIGNIFICANT CHANGE UP (ref 2–14)
NEUTROPHILS # BLD AUTO: 7.26 K/UL — SIGNIFICANT CHANGE UP (ref 1.8–7.4)
NEUTROPHILS NFR BLD AUTO: 72.9 % — SIGNIFICANT CHANGE UP (ref 43–77)
PLATELET # BLD AUTO: 238 K/UL — SIGNIFICANT CHANGE UP (ref 150–400)
POTASSIUM SERPL-MCNC: 3.8 MMOL/L — SIGNIFICANT CHANGE UP (ref 3.5–5.3)
POTASSIUM SERPL-SCNC: 3.8 MMOL/L — SIGNIFICANT CHANGE UP (ref 3.5–5.3)
RBC # BLD: 4.41 M/UL — SIGNIFICANT CHANGE UP (ref 4.2–5.8)
RBC # FLD: 14.3 % — SIGNIFICANT CHANGE UP (ref 10.3–14.5)
SODIUM SERPL-SCNC: 137 MMOL/L — SIGNIFICANT CHANGE UP (ref 135–145)
WBC # BLD: 9.95 K/UL — SIGNIFICANT CHANGE UP (ref 3.8–10.5)
WBC # FLD AUTO: 9.95 K/UL — SIGNIFICANT CHANGE UP (ref 3.8–10.5)

## 2018-12-18 PROCEDURE — 97530 THERAPEUTIC ACTIVITIES: CPT

## 2018-12-18 PROCEDURE — C1889: CPT

## 2018-12-18 PROCEDURE — 80048 BASIC METABOLIC PNL TOTAL CA: CPT

## 2018-12-18 PROCEDURE — C1769: CPT

## 2018-12-18 PROCEDURE — 85730 THROMBOPLASTIN TIME PARTIAL: CPT

## 2018-12-18 PROCEDURE — 97116 GAIT TRAINING THERAPY: CPT

## 2018-12-18 PROCEDURE — 76000 FLUOROSCOPY <1 HR PHYS/QHP: CPT

## 2018-12-18 PROCEDURE — C1713: CPT

## 2018-12-18 PROCEDURE — 97161 PT EVAL LOW COMPLEX 20 MIN: CPT

## 2018-12-18 PROCEDURE — 86901 BLOOD TYPING SEROLOGIC RH(D): CPT

## 2018-12-18 PROCEDURE — 86900 BLOOD TYPING SEROLOGIC ABO: CPT

## 2018-12-18 PROCEDURE — 85610 PROTHROMBIN TIME: CPT

## 2018-12-18 PROCEDURE — 85027 COMPLETE CBC AUTOMATED: CPT

## 2018-12-18 RX ORDER — ENOXAPARIN SODIUM 100 MG/ML
40 INJECTION SUBCUTANEOUS
Qty: 3 | Refills: 0 | OUTPATIENT
Start: 2018-12-18 | End: 2018-12-20

## 2018-12-18 RX ORDER — OXYCODONE HYDROCHLORIDE 5 MG/1
1 TABLET ORAL
Qty: 30 | Refills: 0 | OUTPATIENT
Start: 2018-12-18

## 2018-12-18 RX ORDER — ENOXAPARIN SODIUM 100 MG/ML
0 INJECTION SUBCUTANEOUS
Qty: 0 | Refills: 0 | COMMUNITY

## 2018-12-18 RX ORDER — BENZOCAINE AND MENTHOL 5; 1 G/100ML; G/100ML
1 LIQUID ORAL
Qty: 0 | Refills: 0 | Status: DISCONTINUED | OUTPATIENT
Start: 2018-12-18 | End: 2018-12-18

## 2018-12-18 RX ADMIN — Medication 100 MILLIGRAM(S): at 02:21

## 2018-12-18 RX ADMIN — OXYCODONE HYDROCHLORIDE 5 MILLIGRAM(S): 5 TABLET ORAL at 04:06

## 2018-12-18 RX ADMIN — ENOXAPARIN SODIUM 40 MILLIGRAM(S): 100 INJECTION SUBCUTANEOUS at 13:41

## 2018-12-18 RX ADMIN — OXYCODONE HYDROCHLORIDE 5 MILLIGRAM(S): 5 TABLET ORAL at 03:36

## 2018-12-18 RX ADMIN — BENZOCAINE AND MENTHOL 1 LOZENGE: 5; 1 LIQUID ORAL at 05:56

## 2018-12-18 NOTE — DISCHARGE NOTE ADULT - PROVIDER TOKENS
TOKEN:'[id number]',TOKEN:'[id number]' TOKEN:'[id number]',TOKEN:'[id number]',FREE:[LAST:[Brand],FIRST:[Keshia],PHONE:[phone]],FAX:[(   )    -],ADDRESS:[[address] [place] [address] [address], [place] [address]]]

## 2018-12-18 NOTE — DISCHARGE NOTE ADULT - CARE PLAN
Principal Discharge DX:	Disease of spinal cord, unspecified  Goal:	Please follow up Neurosurgeon in one week. Please call office to make appointment. Please follow up with Primary Care Physician. Please call office to make appointment.  Assessment and plan of treatment:	Please follow up Neurosurgeon in one week. Please call office to make appointment. Please follow up with Primary Care Physician. Please call office to make appointment.  Secondary Diagnosis:	Factor V Leiden  Assessment and plan of treatment:	Please follow up with your PMD (Dr. Chan) on 12/21 at 1130 am regarding dosing of your anticoagulation medication  Secondary Diagnosis:	Pulmonary embolus  Assessment and plan of treatment:	Please follow up with your hematologist Dr. Martinez. Please call office to make appointment.  Secondary Diagnosis:	KATHIE on CPAP  Assessment and plan of treatment:	Please make an appointment for follow up with your primary care physician after discharge.

## 2018-12-18 NOTE — DISCHARGE NOTE ADULT - NS AS ACTIVITY OBS
Walking-Indoors allowed/No Heavy lifting/straining/Do not make important decisions/Do not drive or operate machinery/Walking-Outdoors allowed/Stairs allowed/Bathing allowed

## 2018-12-18 NOTE — DISCHARGE NOTE ADULT - HOSPITAL COURSE
63yr old pleasant male presents to PST for a C3-4 Anterior Cervical Discectomy and Fusion on 12/17/2018. PMH: KATHIE (CPAP), Diverticulitis (2017- admission c/b PE now on Coumadin, followed by Hematology. As per Hematology pt to see him prior to sx to discuss possible bridging to Lovenox) and Possible Factor V Leiden as per Hematologist.  Pt denies any chest pain or SOB and feels well otherwise.  He is s/p C4-5 ACDF from 12/18. His post op course was uncomplicated. Called and spoke with Dr. Martinez [phone] (patient's hematologist) regarding when to restart the coumadin for high risk for recurrent DVT/VTE. Patient was previously on coumadin which was stopped by Dr. Martinez 5-7 before surgery and patient was put on theraputic lovenox. Dr. Bone wants the patient to get prophylactic lovenox until POD #5 and then can start full dose lovenox and be recoumadinized pod#5.  Advised Dr. Martinez that this is Dr. Bone's preference in order to reduce risk of post op bleeding. He told me that the patient make a follow up appointment with his PMD instead regarding the dosing on POD#5. Notified patient of the following and he understands with good read back.  patient called and made an appointment with the PMD for this friday at 1130 am regarding dosing. PT saw the patient and they recommend outpatient PT. At the time of discharge patient is neurologically and hemodynamically stable and clear for discharge home.

## 2018-12-18 NOTE — DISCHARGE NOTE ADULT - ADDITIONAL INSTRUCTIONS
You have an appoinment with your PMD Dr. Chan on friday at 1130 am before you begin full dose anticoagulation. They will advise you the dosage of your medication.  Please make an appointment with your hematologist Dr. Crow after you get discharged from hospital.   Please follow up Neurosurgeon in one week. Please call office to make appointment.

## 2018-12-18 NOTE — CHART NOTE - NSCHARTNOTEFT_GEN_A_CORE
DALTON [id number]      Drain type: []SD []SG [] LADONNA [x] HMV [] Lumbar drain [] EVD [] ICP Wing [] Abd drain     Patient's position while drain removed: flat     [x] Patient tolerated well [x] No complications [] complications:     Additional Info: Asked by Dr. Bone to remove drain.

## 2018-12-18 NOTE — DISCHARGE NOTE ADULT - INSTRUCTIONS
Regular diet.   NO heavy lifting, strenous activity, twisting, bending, driving, or working until cleared by your physician.  Please return to the emergency department if you develop changes in mental status, seizures, fainting, dizziness, changes in vision, lethargy, nausea, vomiting, chest pain, shortness of breathe or severe pain.

## 2018-12-18 NOTE — DISCHARGE NOTE ADULT - MEDICATION SUMMARY - MEDICATIONS TO TAKE
I will START or STAY ON the medications listed below when I get home from the hospital:    oxyCODONE 5 mg oral tablet  -- 1 tab(s) by mouth every 4 hours, As needed, Moderate Pain (4 - 6) MDD:6  -- Indication: For moderate pain    enoxaparin 40 mg/0.4 mL injectable solution  -- 40 milligram(s) subcutaneously once a day (at bedtime) MDD:1  -- It is very important that you take or use this exactly as directed.  Do not skip doses or discontinue unless directed by your doctor.    -- Indication: For anticoagulation

## 2018-12-18 NOTE — DISCHARGE NOTE ADULT - CARE PROVIDERS DIRECT ADDRESSES
,DirectAddress_Unknown,[email or web address] ,DirectAddress_Unknown,[email or web address],DirectAddress_Unknown

## 2018-12-18 NOTE — DISCHARGE NOTE ADULT - PLAN OF CARE
Please follow up with your PMD (Dr. Chan) on 12/21 at 1130 am regarding dosing of your anticoagulation medication Please follow up with your hematologist Dr. Martinez. Please call office to make appointment. Please make an appointment for follow up with your primary care physician after discharge. Please follow up Neurosurgeon in one week. Please call office to make appointment. Please follow up with Primary Care Physician. Please call office to make appointment. wheezes

## 2018-12-18 NOTE — PROGRESS NOTE ADULT - SUBJECTIVE AND OBJECTIVE BOX
SUBJECTIVE: Patient seen and examined at bedside. Complains of incisional pain. His drain was removed earlier today.     OVERNIGHT EVENTS: none     Vital Signs Last 24 Hrs  T(C): 36.8 (18 Dec 2018 09:20), Max: 37.3 (17 Dec 2018 22:15)  T(F): 98.3 (18 Dec 2018 09:20), Max: 99.1 (17 Dec 2018 22:15)  HR: 106 (18 Dec 2018 10:10) (68 - 106)  BP: 129/72 (18 Dec 2018 10:10) (107/66 - 152/89)  BP(mean): 105 (17 Dec 2018 18:30) (100 - 115)  RR: 18 (18 Dec 2018 09:20) (14 - 20)  SpO2: 91% (18 Dec 2018 10:10) (91% - 99%)    PHYSICAL EXAM:    General: No Acute Distress     Neurological: Awake, alert oriented to person, place and time, Following Commands, PERRL, EOMI, Face Symmetrical, Speech Fluent, Moving all extremities, Muscle Strength normal in all four extremities, No Drift, Sensation to Light Touch Intact    Pulmonary: Clear to Auscultation, No Rales, No Rhonchi, No Wheezes     Cardiovascular: S1, S2, Regular Rate and Rhythm     Gastrointestinal: Soft, Nontender, Nondistended     Incision: +steristrips, c/d/i     LABS:                        12.4   9.95  )-----------( 238      ( 18 Dec 2018 08:10 )             38.7    12-18    137  |  103  |  17  ----------------------------<  107<H>  3.8   |  22  |  0.87    Ca    8.4      18 Dec 2018 06:58    PT/INR - ( 17 Dec 2018 08:26 )   PT: 10.5 sec;   INR: 0.92 ratio         PTT - ( 17 Dec 2018 08:26 )  PTT:31.4 sec    DIET: regular diet     IMAGING: no new imaging

## 2018-12-18 NOTE — DISCHARGE NOTE ADULT - MEDICATION SUMMARY - MEDICATIONS TO STOP TAKING
I will STOP taking the medications listed below when I get home from the hospital:    warfarin 2 mg oral tablet  -- 6 milligram(s) by mouth once a day (6/7mg a day alternating)    Lovenox 100 mg/mL injectable solution  -- injectable 2 times a day

## 2018-12-18 NOTE — CHART NOTE - NSCHARTNOTEFT_GEN_A_CORE
Called and spoke with Dr. Martinez [phone] (patient's hematologist) regarding when to restart the coumadin for high risk for recurrent DVT/VTE. Patient was previously on coumadin which was stopped by Dr. Martinez 5-7 before surgery and patient was put on theraputic lovenox. Dr. Bone wants the patient to get prophylactic lovenox until POD #5 and then can start full dose lovenox and be recoumadinized pod#5.  Advised Dr. Martinez that this is Dr. Bone's preference in order to reduce risk of post op bleeding. He told me that the patient make a follow up appointment with his PMD instead regarding the dosing on POD#5. Notified patient of the following and he understands with good read back.  patient called and made an appointment with the PMD for this friday at 1130 am.

## 2018-12-18 NOTE — DISCHARGE NOTE ADULT - CARE PROVIDER_API CALL
Hon PATIENCE Martinez), Hematology; Internal Medicine; Medical Oncology  [address]  [address]  [place] [address]  Phone: [phone]  Fax: [phone]    Brock Bone), Neurological Surgery  [address] [place] [address]  [address]  [place] [address]  Phone: [phone]  Fax: [phone] Hon PATIENCE Martinez (MD), Hematology; Internal Medicine; Medical Oncology  [address] [facility] [facility]  [address]  [place] [address]  Phone: [phone]  Fax: [phone]    Brock Bone), Neurological Surgery  [address] [facility]  [address]  [place] [address]  Phone: [phone]  Fax: [phone]    Keshia Chan  [address], [place] [address]  Phone: [phone]  Fax: (   )    -

## 2018-12-18 NOTE — DISCHARGE NOTE ADULT - PATIENT PORTAL LINK FT
You can access the [facility] Patient Portal, offered by [facility], by registering with the following website: [email or web address]

## 2018-12-18 NOTE — PROGRESS NOTE ADULT - ASSESSMENT
63yr old pleasant male presents to PST for a C3-4 Anterior Cervical Discectomy and Fusion on 12/17/2018. PMH: KATHIE (CPAP), Diverticulitis (2017- admission c/b PE now on Coumadin, followed by Hematology. As per Hematology pt to see him prior to sx to discuss possible bridging to Lovenox) and Possible Factor V Leiden as per Hematologist.  Pt denies any chest pain or SOB and feels well otherwise.     PROCEDURE: 12/18 s/p C4-5 ACDF      POD#1    PLAN:  -Neuro: neuro stable, drain removed earlier today as per Dr. Bone's request.  -Encouraged incentive spirometry.  -Pt to get prophylactic dose lovenox until POD#5 at which point pt can be put on full dose lovenox/coumadin as per Dr. Bone. Pt to follow up with PMD on friday (POD4) regarding the dosing. Patient called and made appointment with his PMD for 1130 am.   -DVT ppx: venodynes and sql  -PT: outpatient PT w RW.    Above discussed with Dr. Bone  Spectralink #[id number]

## 2018-12-28 ENCOUNTER — OTHER (OUTPATIENT)
Age: 63
End: 2018-12-28

## 2018-12-28 ENCOUNTER — APPOINTMENT (OUTPATIENT)
Dept: SPINE | Facility: CLINIC | Age: 63
End: 2018-12-28
Payer: COMMERCIAL

## 2018-12-28 VITALS
HEIGHT: 68.5 IN | SYSTOLIC BLOOD PRESSURE: 137 MMHG | BODY MASS INDEX: 33.26 KG/M2 | HEART RATE: 108 BPM | TEMPERATURE: 98 F | WEIGHT: 222 LBS | DIASTOLIC BLOOD PRESSURE: 72 MMHG

## 2018-12-28 PROCEDURE — 99024 POSTOP FOLLOW-UP VISIT: CPT

## 2019-01-15 ENCOUNTER — APPOINTMENT (OUTPATIENT)
Dept: SPINE | Facility: CLINIC | Age: 64
End: 2019-01-15
Payer: COMMERCIAL

## 2019-01-15 VITALS
WEIGHT: 222 LBS | BODY MASS INDEX: 33.26 KG/M2 | SYSTOLIC BLOOD PRESSURE: 134 MMHG | HEIGHT: 68.5 IN | DIASTOLIC BLOOD PRESSURE: 78 MMHG

## 2019-01-15 PROCEDURE — 99024 POSTOP FOLLOW-UP VISIT: CPT

## 2019-01-15 RX ORDER — ENOXAPARIN SODIUM 100 MG/ML
100 INJECTION SUBCUTANEOUS
Refills: 0 | Status: DISCONTINUED | COMMUNITY
End: 2019-01-15

## 2019-01-15 RX ORDER — MUPIROCIN 20 MG/G
2 OINTMENT TOPICAL
Qty: 1 | Refills: 0 | Status: DISCONTINUED | COMMUNITY
Start: 2018-12-03 | End: 2019-01-15

## 2019-02-06 NOTE — ED ADULT NURSE NOTE - ATTEMPT TO OOB
no Surgeon/Pathologist Verbiage (Will Incorporate Name Of Surgeon From Intro If Not Blank): operated in two distinct and integrated capacities as the surgeon and pathologist.

## 2019-03-04 ENCOUNTER — APPOINTMENT (OUTPATIENT)
Dept: CARDIOLOGY | Facility: CLINIC | Age: 64
End: 2019-03-04
Payer: COMMERCIAL

## 2019-03-04 ENCOUNTER — NON-APPOINTMENT (OUTPATIENT)
Age: 64
End: 2019-03-04

## 2019-03-04 VITALS
WEIGHT: 216 LBS | SYSTOLIC BLOOD PRESSURE: 150 MMHG | OXYGEN SATURATION: 96 % | HEIGHT: 68.5 IN | DIASTOLIC BLOOD PRESSURE: 90 MMHG | HEART RATE: 90 BPM | BODY MASS INDEX: 32.36 KG/M2

## 2019-03-04 DIAGNOSIS — R94.31 ABNORMAL ELECTROCARDIOGRAM [ECG] [EKG]: ICD-10-CM

## 2019-03-04 DIAGNOSIS — R09.89 OTHER SPECIFIED SYMPTOMS AND SIGNS INVOLVING THE CIRCULATORY AND RESPIRATORY SYSTEMS: ICD-10-CM

## 2019-03-04 DIAGNOSIS — E78.5 HYPERLIPIDEMIA, UNSPECIFIED: ICD-10-CM

## 2019-03-04 DIAGNOSIS — Z01.810 ENCOUNTER FOR PREPROCEDURAL CARDIOVASCULAR EXAMINATION: ICD-10-CM

## 2019-03-04 DIAGNOSIS — Z12.11 ENCOUNTER FOR SCREENING FOR MALIGNANT NEOPLASM OF COLON: ICD-10-CM

## 2019-03-04 PROCEDURE — 99215 OFFICE O/P EST HI 40 MIN: CPT

## 2019-03-04 PROCEDURE — 93000 ELECTROCARDIOGRAM COMPLETE: CPT

## 2019-03-04 NOTE — PHYSICAL EXAM
[General Appearance - In No Acute Distress] : no acute distress [Normal Conjunctiva] : the conjunctiva exhibited no abnormalities [No Oral Pallor] : no oral pallor [Respiration, Rhythm And Depth] : normal respiratory rhythm and effort [Auscultation Breath Sounds / Voice Sounds] : lungs were clear to auscultation bilaterally [Bowel Sounds] : normal bowel sounds [Abdomen Tenderness] : non-tender [Abnormal Walk] : normal gait [Cyanosis, Localized] : no localized cyanosis [] : no rash [Oriented To Time, Place, And Person] : oriented to person, place, and time [Not Palpable] : not palpable [No Precordial Heave] : no precordial heave was noted [Normal Rate] : normal [Rhythm Regular] : regular [Normal S1] : normal S1 [Normal S2] : normal S2 [No Gallop] : no gallop heard [No Murmur] : no murmurs heard [2+] : left 2+ [No Abnormalities] : the abdominal aorta was not enlarged and no bruit was heard [No Pitting Edema] : no pitting edema present [FreeTextEntry1] : no JVD is appreciated at a 45° angle [Apical Thrill] : no thrill palpable at the apex [Click] : no click [Pericardial Rub] : no pericardial rub [Right Carotid Bruit] : no bruit heard over the right carotid [Left Carotid Bruit] : no bruit heard over the left carotid

## 2019-03-04 NOTE — DISCUSSION/SUMMARY
[FreeTextEntry1] : Mr. Abbott does not have a known cardiac history. He had an echocardiogram performed after being diagnosed with right upper extremity DVT/pulmonary emboli on 1/10/17 which was interpreted as being remarkable only for mild left ventricular diastolic dysfunction. He has a history of a dyslipidemia, for which he has declined treatment to date. He does not describe having experienced any signs or symptoms to suggest the presence of an anginal syndrome, congestive heart failure, or a hemodynamically-compromising arrhythmia. His cardiac examination today is unremarkable. His blood pressure reading today is normal. His electrocardiogram today reveals sinus rhythm with mild non-specific T-wave abnormalities, essentially unchanged from his previous office tracing.\par \par In view of his longstanding history of a dyslipidemia, I am referring the patient for exercise stress testing to evaluate for the presence of underlying ischemic heart disease. In addition, carotid artery Doppler testing will be performed to evaluate for the presence of atherosclerosis formation, in order to determine if the patient would benefit from statin therapy. The patient is going to make arrangements to have these studies performed through our office, and I will telephone him to discuss the findings, once the studies have been completed.\par \par The importance of proper dietary habits, weight loss, and regular exercise as tolerated was discussed with the patient today.\par \par I have asked the patient to call me if he should have any questions or problems pertaining to these matters, and especially if he should experience any concerning symptoms. Further recommendations regarding cardiac evaluation and/or treatment will be considered, pending the patient's clinical course, as well as the findings on the upcoming cardiovascular studies.

## 2019-03-04 NOTE — HISTORY OF PRESENT ILLNESS
[FreeTextEntry1] : Mr. Brock Abbott presented to the office today for follow-up cardiac evaluation. I previously evaluated the patient and initial cardiology consultation on 12/12/18 for cardiac clearance prior to the patient undergoing cervical disc surgery.\par \par The patient is a 63-year-old male with a history of a dyslipidemia, obstructive sleep apnea, GERD, diverticulosis, benign colon polyps, a right upper extremity DVT, pulmonary emboli, a circulating lupus anticoagulant (on chronic anticoagulation since exhibiting RUE DVT/PE), cervical disc disease with cervical myelopathy (s/p C3-C4 diskectomy/fusion), Fuchs corneal dystrophy, and alcoholism.\par \par The patient has been stable from a cardiac symptomatic standpoint since his previous visit here on 12/12/18. Specifically, he does not describe having experienced chest discomfort or dyspnea on exertion in association with his activities. He has not noted orthopnea or paroxysmal nocturnal dyspnea, noting that he sleeps with a CPAP device for treatment of obstructive sleep apnea. He has not noted lower extremity edema. He has not experienced any episodes of palpitations, presyncope or syncope.\par \par Review of systems is significant for the patient having undergone C3-C4 anterior discectomy, placement of an intervertebral device, and fusion with instrumentation on 12/17/18 for treatment of cervical disc disease associated with spinal stenosis and cervical myelopathy.\par \par As far as risk factors for coronary artery disease are concerned, the patient has a history of a dyslipidemia, however, he has declined therapy to date. He does not have a history of diabetes. He does not have a history of hypertension. He discontinued cigarette smoking approximately 25 years ago, having previously smoked 1-1/2 packs per day for a period of 20 years. He does not have a known immediate family history of premature coronary artery disease.\par \par Past medical/surgical history is otherwise significant for the patient having been hospitalized at [facility] from 12/29/16 through 1/14/17 with sigmoid diverticulitis, for which a PICC line had been inserted for delivery of antibiotic therapy. The patient developed right upper extremity swelling, for which the PICC line was removed, and sonography revealed evidence for a right upper extremity deep venous thrombosis, for which anticoagulation was initiated (Lovenox, subsequently transitioning to Coumadin). A chest CT angiogram performed at that time revealed evidence for pulmonary emboli as well. The patient subsequently underwent an evaluation with a hematologist, and was diagnosed as having a circulating lupus anticoagulant, for which anticoagulation was recommended indefinitely, in view of this finding and the clotting event. He underwent a screening colonoscopy on 3/7/14, at which time he was noted to have diverticulosis, as well as 2 polyps, which were resected (right colon tubular adenoma and sigmoid hyperplastic polyp). He underwent a sleep study on 1/6/14 which was positive for evidence of obstructive sleep apnea, for which CPAP therapy was prescribed period he is status post laparoscopic appendectomy several years ago. He has a history of GERD. He has a history of Fuchs corneal dystrophy. He is a recovering alcoholic, having last consumed alcohol more than 25 years ago.\par \par Echocardiography performed during the patient's hospitalization at [facility] on 1/10/17 revealed normal cardiac chamber sizes with normal left ventricular wall thickness and wall motion. Left ventricular systolic function was normal, with an estimated ejection fraction of 70%. Mild left ventricular diastolic dysfunction was demonstrated. No significant valvular abnormalities were demonstrated. There was no evidence of pulmonary hypertension.\par \par Laboratory studies performed on 11/11/13 revealed cholesterol 262, triglycerides 157, HDL 52, and calculated .

## 2019-03-19 ENCOUNTER — APPOINTMENT (OUTPATIENT)
Dept: SPINE | Facility: CLINIC | Age: 64
End: 2019-03-19
Payer: COMMERCIAL

## 2019-03-19 VITALS
SYSTOLIC BLOOD PRESSURE: 120 MMHG | DIASTOLIC BLOOD PRESSURE: 80 MMHG | BODY MASS INDEX: 32.21 KG/M2 | HEIGHT: 68.5 IN | WEIGHT: 215 LBS

## 2019-03-19 DIAGNOSIS — Z98.890 OTHER SPECIFIED POSTPROCEDURAL STATES: ICD-10-CM

## 2019-03-19 PROCEDURE — 99213 OFFICE O/P EST LOW 20 MIN: CPT

## 2019-06-11 ENCOUNTER — APPOINTMENT (OUTPATIENT)
Dept: SPINE | Facility: CLINIC | Age: 64
End: 2019-06-11
Payer: COMMERCIAL

## 2019-06-11 VITALS
WEIGHT: 210 LBS | DIASTOLIC BLOOD PRESSURE: 70 MMHG | BODY MASS INDEX: 31.46 KG/M2 | SYSTOLIC BLOOD PRESSURE: 112 MMHG | HEIGHT: 68.5 IN

## 2019-06-11 DIAGNOSIS — G95.9 DISEASE OF SPINAL CORD, UNSPECIFIED: ICD-10-CM

## 2019-06-11 PROCEDURE — 99214 OFFICE O/P EST MOD 30 MIN: CPT

## 2020-01-06 ENCOUNTER — APPOINTMENT (OUTPATIENT)
Dept: SPINE | Facility: CLINIC | Age: 65
End: 2020-01-06
Payer: MEDICARE

## 2020-01-06 VITALS
WEIGHT: 223 LBS | OXYGEN SATURATION: 94 % | DIASTOLIC BLOOD PRESSURE: 80 MMHG | HEIGHT: 68 IN | SYSTOLIC BLOOD PRESSURE: 142 MMHG | BODY MASS INDEX: 33.8 KG/M2 | RESPIRATION RATE: 16 BRPM | HEART RATE: 98 BPM

## 2020-01-06 DIAGNOSIS — M48.02 SPINAL STENOSIS, CERVICAL REGION: ICD-10-CM

## 2020-01-06 PROCEDURE — 99213 OFFICE O/P EST LOW 20 MIN: CPT

## 2020-09-20 NOTE — ED ADULT NURSE NOTE - OBJECTIVE STATEMENT
pt comes in via triage he states that he was sent from his PMD for LLQ abd pain after eating for the past few days and urinary hesitation--- he states that he does not have the pain at rest but he has it with movement
20-Sep-2020 15:30

## 2021-08-12 NOTE — DISCHARGE NOTE ADULT - CLICK TO LAUNCH ORM
Impression: S/P Posterior Vitrectomy (PPvit) OS - 1 Day. Encounter for surgical aftercare following surgery on a sense organ  Z48.810. Excellent post op course   Post operative instructions reviewed - Plan: RTC as scheduled c Dr. Jeffrey Keane. --Continue all meds--Advised patient to use artificial tears for comfort. .

## 2021-11-22 NOTE — PATIENT PROFILE ADULT. - FUNCTIONAL SCREEN CURRENT LEVEL: EATING, MLM
Received request via: Pharmacy    Was the patient seen in the last year in this department? Yes    Does the patient have an active prescription (recently filled or refills available) for medication(s) requested? No   (0) independent

## 2022-04-17 NOTE — ED ADULT NURSE NOTE - NS ED NURSE PATIENT LEFT UNIT TIME
Headache    A headache is pain or discomfort felt around the head or neck area. The specific cause of a headache may not be found as there are many types including tension headaches, migraine headaches, and cluster headaches. Watch your condition for any changes. Things you can do to manage your pain include taking over the counter and prescription medications as instructed by your health care provider, lying down in a dark quiet room, limiting stress, getting regular sleep, and refraining from alcohol and tobacco products.    SEEK IMMEDIATE MEDICAL CARE IF YOU HAVE ANY OF THE FOLLOWING SYMPTOMS: fever, vomiting, stiff neck, loss of vision, problems with speech, muscle weakness, loss of balance, trouble walking, passing out, or confusion.    PLEASE FOLLOW UP WITH YOUR NEUROLOGIST, PAIN MANAGEMENT TEAM, AND OBGYN FOR FURTHER EVALUATION OF YOUR SYMPTOMS.
21:41

## 2023-01-06 NOTE — PATIENT PROFILE ADULT - BRADEN ACTIVITY
Monitor site for signs of infection including redness, warmth, and tenderness. (3) walks occasionally

## 2023-04-25 NOTE — H&P PST ADULT - PMH
Patient received portal message.    Closing encounter.   Alcohol abuse  abstinent 25 years  Carpal tunnel syndrome, bilateral    Disease of spinal cord, unspecified    Diverticulitis  2016'  Factor V Leiden  ? as per hematology  Fuchs' corneal dystrophy  B/L eyes  KATHIE on CPAP    Pulmonary embolus  1/2017'

## 2024-07-18 NOTE — H&P ADULT. - PSH
Doris Michelle, Bambi MENDES MA  Ref by Phillip Lam MD for snoring, chronic fatigue, poor sleep, anatomy. Request HST.  
No significant past surgical history
